# Patient Record
Sex: FEMALE | HISPANIC OR LATINO | URBAN - METROPOLITAN AREA
[De-identification: names, ages, dates, MRNs, and addresses within clinical notes are randomized per-mention and may not be internally consistent; named-entity substitution may affect disease eponyms.]

---

## 2017-06-22 ENCOUNTER — APPOINTMENT (RX ONLY)
Dept: URBAN - METROPOLITAN AREA CLINIC 161 | Facility: CLINIC | Age: 77
Setting detail: DERMATOLOGY
End: 2017-06-22

## 2017-06-22 DIAGNOSIS — L72.0 EPIDERMAL CYST: ICD-10-CM

## 2017-06-22 DIAGNOSIS — L81.4 OTHER MELANIN HYPERPIGMENTATION: ICD-10-CM

## 2017-06-22 DIAGNOSIS — L82.1 OTHER SEBORRHEIC KERATOSIS: ICD-10-CM

## 2017-06-22 DIAGNOSIS — D22 MELANOCYTIC NEVI: ICD-10-CM

## 2017-06-22 DIAGNOSIS — L90.8 OTHER ATROPHIC DISORDERS OF SKIN: ICD-10-CM

## 2017-06-22 DIAGNOSIS — L81.8 OTHER SPECIFIED DISORDERS OF PIGMENTATION: ICD-10-CM

## 2017-06-22 DIAGNOSIS — D18.0 HEMANGIOMA: ICD-10-CM

## 2017-06-22 DIAGNOSIS — L81.0 POSTINFLAMMATORY HYPERPIGMENTATION: ICD-10-CM

## 2017-06-22 PROBLEM — D22.61 MELANOCYTIC NEVI OF RIGHT UPPER LIMB, INCLUDING SHOULDER: Status: ACTIVE | Noted: 2017-06-22

## 2017-06-22 PROBLEM — J45.909 UNSPECIFIED ASTHMA, UNCOMPLICATED: Status: ACTIVE | Noted: 2017-06-22

## 2017-06-22 PROBLEM — J30.1 ALLERGIC RHINITIS DUE TO POLLEN: Status: ACTIVE | Noted: 2017-06-22

## 2017-06-22 PROBLEM — D22.72 MELANOCYTIC NEVI OF LEFT LOWER LIMB, INCLUDING HIP: Status: ACTIVE | Noted: 2017-06-22

## 2017-06-22 PROBLEM — D22.5 MELANOCYTIC NEVI OF TRUNK: Status: ACTIVE | Noted: 2017-06-22

## 2017-06-22 PROBLEM — D18.01 HEMANGIOMA OF SKIN AND SUBCUTANEOUS TISSUE: Status: ACTIVE | Noted: 2017-06-22

## 2017-06-22 PROCEDURE — 99213 OFFICE O/P EST LOW 20 MIN: CPT

## 2017-06-22 PROCEDURE — ? OBSERVATION

## 2017-06-22 PROCEDURE — ? COUNSELING

## 2017-06-22 PROCEDURE — ? REFERRAL CORRESPONDENCE

## 2017-06-22 ASSESSMENT — LOCATION DETAILED DESCRIPTION DERM
LOCATION DETAILED: RIGHT DISTAL POSTERIOR THIGH
LOCATION DETAILED: LEFT MEDIAL SUPERIOR CHEST
LOCATION DETAILED: RIGHT LATERAL ABDOMEN
LOCATION DETAILED: RIGHT CENTRAL MALAR CHEEK
LOCATION DETAILED: RIGHT CENTRAL LATERAL NECK
LOCATION DETAILED: LEFT PROXIMAL PRETIBIAL REGION
LOCATION DETAILED: RIGHT PROXIMAL LATERAL CALF
LOCATION DETAILED: LEFT NASAL DORSUM
LOCATION DETAILED: RIGHT ANTERIOR DISTAL THIGH
LOCATION DETAILED: LEFT FOREHEAD
LOCATION DETAILED: LEFT RADIAL DORSAL HAND
LOCATION DETAILED: LEFT LOWER CUTANEOUS LIP
LOCATION DETAILED: LEFT MID-UPPER BACK
LOCATION DETAILED: LEFT CENTRAL LATERAL NECK
LOCATION DETAILED: COLUMELLA
LOCATION DETAILED: LEFT MEDIAL BREAST 8-9:00 REGION
LOCATION DETAILED: LEFT SUPERIOR MEDIAL MIDBACK
LOCATION DETAILED: RIGHT MEDIAL BREAST 12-1:00 REGION
LOCATION DETAILED: LEFT ANTERIOR SHOULDER
LOCATION DETAILED: LEFT PROXIMAL POSTERIOR UPPER ARM
LOCATION DETAILED: RIGHT LATERAL SUPERIOR EYELID
LOCATION DETAILED: LEFT ANTERIOR DISTAL THIGH
LOCATION DETAILED: RIGHT CENTRAL TEMPLE
LOCATION DETAILED: RIGHT PROXIMAL RADIAL DORSAL FOREARM
LOCATION DETAILED: LEFT DISTAL LATERAL CALF
LOCATION DETAILED: RIGHT PROXIMAL LATERAL POSTERIOR UPPER ARM
LOCATION DETAILED: RIGHT BUTTOCK
LOCATION DETAILED: RIGHT SUPERIOR UPPER BACK
LOCATION DETAILED: LEFT DISTAL POSTERIOR THIGH
LOCATION DETAILED: RIGHT LATERAL SUPERIOR CHEST
LOCATION DETAILED: LEFT PROXIMAL DORSAL FOREARM
LOCATION DETAILED: RIGHT POSTERIOR SHOULDER
LOCATION DETAILED: LEFT VENTRAL LATERAL PROXIMAL FOREARM
LOCATION DETAILED: RIGHT MEDIAL FOREHEAD
LOCATION DETAILED: RIGHT DISTAL PRETIBIAL REGION
LOCATION DETAILED: RIGHT INFERIOR LATERAL UPPER BACK
LOCATION DETAILED: RIGHT CENTRAL ZYGOMA

## 2017-06-22 ASSESSMENT — LOCATION ZONE DERM
LOCATION ZONE: LIP
LOCATION ZONE: EYELID
LOCATION ZONE: LEG
LOCATION ZONE: NECK
LOCATION ZONE: HAND
LOCATION ZONE: NOSE
LOCATION ZONE: TRUNK
LOCATION ZONE: FACE
LOCATION ZONE: ARM

## 2017-06-22 ASSESSMENT — LOCATION SIMPLE DESCRIPTION DERM
LOCATION SIMPLE: NECK
LOCATION SIMPLE: LEFT POSTERIOR THIGH
LOCATION SIMPLE: LEFT LOWER BACK
LOCATION SIMPLE: NOSE
LOCATION SIMPLE: RIGHT THIGH
LOCATION SIMPLE: RIGHT CHEEK
LOCATION SIMPLE: RIGHT CALF
LOCATION SIMPLE: RIGHT FOREHEAD
LOCATION SIMPLE: RIGHT SHOULDER
LOCATION SIMPLE: RIGHT PRETIBIAL REGION
LOCATION SIMPLE: CHEST
LOCATION SIMPLE: RIGHT ZYGOMA
LOCATION SIMPLE: LEFT POSTERIOR UPPER ARM
LOCATION SIMPLE: RIGHT SUPERIOR EYELID
LOCATION SIMPLE: LEFT THIGH
LOCATION SIMPLE: LEFT UPPER BACK
LOCATION SIMPLE: RIGHT POSTERIOR THIGH
LOCATION SIMPLE: LEFT BREAST
LOCATION SIMPLE: RIGHT BREAST
LOCATION SIMPLE: LEFT SHOULDER
LOCATION SIMPLE: ABDOMEN
LOCATION SIMPLE: RIGHT POSTERIOR UPPER ARM
LOCATION SIMPLE: LEFT PRETIBIAL REGION
LOCATION SIMPLE: RIGHT UPPER BACK
LOCATION SIMPLE: RIGHT FOREARM
LOCATION SIMPLE: LEFT LIP
LOCATION SIMPLE: LEFT FOREHEAD
LOCATION SIMPLE: LEFT FOREARM
LOCATION SIMPLE: LEFT CALF
LOCATION SIMPLE: LEFT HAND
LOCATION SIMPLE: RIGHT TEMPLE
LOCATION SIMPLE: RIGHT BUTTOCK
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
## 2017-07-12 ENCOUNTER — FOLLOW UP ESTABLISHED (OUTPATIENT)
Dept: URBAN - METROPOLITAN AREA CLINIC 51 | Facility: CLINIC | Age: 77
End: 2017-07-12
Payer: MEDICARE

## 2017-07-12 PROCEDURE — 92014 COMPRE OPH EXAM EST PT 1/>: CPT | Performed by: OPTOMETRIST

## 2017-07-12 PROCEDURE — 92250 FUNDUS PHOTOGRAPHY W/I&R: CPT | Performed by: OPTOMETRIST

## 2017-07-12 ASSESSMENT — VISUAL ACUITY
OS: 20/20
OD: 20/20

## 2017-07-12 ASSESSMENT — INTRAOCULAR PRESSURE
OS: 18
OD: 18

## 2018-02-08 ENCOUNTER — FOLLOW UP ESTABLISHED (OUTPATIENT)
Dept: URBAN - METROPOLITAN AREA CLINIC 44 | Facility: CLINIC | Age: 78
End: 2018-02-08
Payer: MEDICARE

## 2018-02-08 DIAGNOSIS — B02.29 OTHER POSTHERPETIC NERVOUS SYSTEM INVOLVEMENT: ICD-10-CM

## 2018-02-08 PROCEDURE — 92012 INTRM OPH EXAM EST PATIENT: CPT | Performed by: OPTOMETRIST

## 2018-06-14 ENCOUNTER — APPOINTMENT (RX ONLY)
Dept: URBAN - METROPOLITAN AREA CLINIC 161 | Facility: CLINIC | Age: 78
Setting detail: DERMATOLOGY
End: 2018-06-14

## 2018-06-14 DIAGNOSIS — L81.0 POSTINFLAMMATORY HYPERPIGMENTATION: ICD-10-CM

## 2018-06-14 DIAGNOSIS — L90.8 OTHER ATROPHIC DISORDERS OF SKIN: ICD-10-CM

## 2018-06-14 DIAGNOSIS — L81.4 OTHER MELANIN HYPERPIGMENTATION: ICD-10-CM

## 2018-06-14 DIAGNOSIS — D22 MELANOCYTIC NEVI: ICD-10-CM

## 2018-06-14 DIAGNOSIS — L81.8 OTHER SPECIFIED DISORDERS OF PIGMENTATION: ICD-10-CM

## 2018-06-14 DIAGNOSIS — L82.1 OTHER SEBORRHEIC KERATOSIS: ICD-10-CM

## 2018-06-14 PROBLEM — D22.5 MELANOCYTIC NEVI OF TRUNK: Status: ACTIVE | Noted: 2018-06-14

## 2018-06-14 PROBLEM — Z85.3 PERSONAL HISTORY OF MALIGNANT NEOPLASM OF BREAST: Status: ACTIVE | Noted: 2018-06-14

## 2018-06-14 PROBLEM — D22.71 MELANOCYTIC NEVI OF RIGHT LOWER LIMB, INCLUDING HIP: Status: ACTIVE | Noted: 2018-06-14

## 2018-06-14 PROBLEM — D22.72 MELANOCYTIC NEVI OF LEFT LOWER LIMB, INCLUDING HIP: Status: ACTIVE | Noted: 2018-06-14

## 2018-06-14 PROCEDURE — ? COUNSELING

## 2018-06-14 PROCEDURE — ? REFERRAL CORRESPONDENCE

## 2018-06-14 PROCEDURE — 99213 OFFICE O/P EST LOW 20 MIN: CPT

## 2018-06-14 PROCEDURE — ? OBSERVATION

## 2018-06-14 ASSESSMENT — LOCATION DETAILED DESCRIPTION DERM
LOCATION DETAILED: LEFT NASAL DORSUM
LOCATION DETAILED: LEFT PROXIMAL LATERAL POSTERIOR THIGH
LOCATION DETAILED: LEFT MEDIAL BREAST 8-9:00 REGION
LOCATION DETAILED: LEFT RADIAL DORSAL HAND
LOCATION DETAILED: RIGHT SUPERIOR UPPER BACK
LOCATION DETAILED: LEFT SUPERIOR MEDIAL UPPER BACK
LOCATION DETAILED: LEFT CENTRAL ZYGOMA
LOCATION DETAILED: RIGHT ANTERIOR PROXIMAL THIGH
LOCATION DETAILED: RIGHT ULNAR DORSAL HAND
LOCATION DETAILED: LEFT SUPERIOR FOREHEAD
LOCATION DETAILED: RIGHT DISTAL POSTERIOR UPPER ARM
LOCATION DETAILED: LEFT PROXIMAL DORSAL FOREARM
LOCATION DETAILED: RIGHT ANTERIOR DISTAL THIGH
LOCATION DETAILED: LEFT PROXIMAL LATERAL POSTERIOR UPPER ARM
LOCATION DETAILED: LEFT PROXIMAL PRETIBIAL REGION
LOCATION DETAILED: LEFT MID-UPPER BACK
LOCATION DETAILED: RIGHT DISTAL CALF
LOCATION DETAILED: RIGHT MID-UPPER BACK
LOCATION DETAILED: LEFT DISTAL RADIAL DORSAL FOREARM
LOCATION DETAILED: RIGHT RIB CAGE
LOCATION DETAILED: RIGHT SUPERIOR MEDIAL MIDBACK
LOCATION DETAILED: RIGHT INFERIOR MEDIAL FOREHEAD
LOCATION DETAILED: LEFT SUPERIOR LATERAL MALAR CHEEK
LOCATION DETAILED: LEFT ANTERIOR SHOULDER
LOCATION DETAILED: LEFT SUPERIOR LATERAL MIDBACK
LOCATION DETAILED: RIGHT LATERAL ABDOMEN
LOCATION DETAILED: SUBXIPHOID
LOCATION DETAILED: LEFT PROXIMAL CALF
LOCATION DETAILED: LEFT LATERAL BREAST 2-3:00 REGION
LOCATION DETAILED: RIGHT PROXIMAL PRETIBIAL REGION
LOCATION DETAILED: RIGHT PROXIMAL DORSAL FOREARM
LOCATION DETAILED: RIGHT PERIAREOLAR BREAST 11-12:00 REGION
LOCATION DETAILED: LEFT DISTAL POSTERIOR THIGH
LOCATION DETAILED: LEFT RIB CAGE
LOCATION DETAILED: LEFT MEDIAL SUPERIOR CHEST
LOCATION DETAILED: LEFT MEDIAL BUTTOCK

## 2018-06-14 ASSESSMENT — LOCATION SIMPLE DESCRIPTION DERM
LOCATION SIMPLE: LEFT FOREHEAD
LOCATION SIMPLE: LEFT BUTTOCK
LOCATION SIMPLE: RIGHT POSTERIOR UPPER ARM
LOCATION SIMPLE: LEFT POSTERIOR UPPER ARM
LOCATION SIMPLE: LEFT UPPER BACK
LOCATION SIMPLE: RIGHT FOREHEAD
LOCATION SIMPLE: RIGHT BREAST
LOCATION SIMPLE: LEFT BREAST
LOCATION SIMPLE: LEFT FOREARM
LOCATION SIMPLE: RIGHT THIGH
LOCATION SIMPLE: RIGHT UPPER BACK
LOCATION SIMPLE: LEFT LOWER BACK
LOCATION SIMPLE: RIGHT HAND
LOCATION SIMPLE: RIGHT LOWER BACK
LOCATION SIMPLE: LEFT CALF
LOCATION SIMPLE: LEFT CHEEK
LOCATION SIMPLE: RIGHT PRETIBIAL REGION
LOCATION SIMPLE: LEFT POSTERIOR THIGH
LOCATION SIMPLE: LEFT SHOULDER
LOCATION SIMPLE: CHEST
LOCATION SIMPLE: LEFT PRETIBIAL REGION
LOCATION SIMPLE: LEFT HAND
LOCATION SIMPLE: LEFT ZYGOMA
LOCATION SIMPLE: RIGHT FOREARM
LOCATION SIMPLE: NOSE
LOCATION SIMPLE: ABDOMEN
LOCATION SIMPLE: RIGHT CALF

## 2018-06-14 ASSESSMENT — LOCATION ZONE DERM
LOCATION ZONE: FACE
LOCATION ZONE: ARM
LOCATION ZONE: TRUNK
LOCATION ZONE: HAND
LOCATION ZONE: LEG
LOCATION ZONE: NOSE
LOCATION ZONE: FACE
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
## 2018-07-12 ENCOUNTER — FOLLOW UP ESTABLISHED (OUTPATIENT)
Dept: URBAN - METROPOLITAN AREA CLINIC 51 | Facility: CLINIC | Age: 78
End: 2018-07-12
Payer: MEDICARE

## 2018-07-12 DIAGNOSIS — H04.123 DRY EYE SYNDROME OF BILATERAL LACRIMAL GLANDS: Primary | ICD-10-CM

## 2018-07-12 DIAGNOSIS — Z96.1 PRESENCE OF INTRAOCULAR LENS: ICD-10-CM

## 2018-07-12 DIAGNOSIS — H52.4 PRESBYOPIA: ICD-10-CM

## 2018-07-12 PROCEDURE — 92250 FUNDUS PHOTOGRAPHY W/I&R: CPT | Performed by: OPTOMETRIST

## 2018-07-12 PROCEDURE — 92015 DETERMINE REFRACTIVE STATE: CPT | Performed by: OPTOMETRIST

## 2018-07-12 PROCEDURE — 92014 COMPRE OPH EXAM EST PT 1/>: CPT | Performed by: OPTOMETRIST

## 2018-07-12 ASSESSMENT — VISUAL ACUITY
OS: 20/20
OD: 20/25

## 2018-07-12 ASSESSMENT — KERATOMETRY
OD: 43.83
OS: 43.70

## 2018-07-12 ASSESSMENT — INTRAOCULAR PRESSURE
OD: 18
OS: 18

## 2019-06-13 ENCOUNTER — APPOINTMENT (RX ONLY)
Dept: URBAN - METROPOLITAN AREA CLINIC 173 | Facility: CLINIC | Age: 79
Setting detail: DERMATOLOGY
End: 2019-06-13

## 2019-06-13 DIAGNOSIS — L82.1 OTHER SEBORRHEIC KERATOSIS: ICD-10-CM

## 2019-06-13 DIAGNOSIS — L29.89 OTHER PRURITUS: ICD-10-CM

## 2019-06-13 DIAGNOSIS — L81.8 OTHER SPECIFIED DISORDERS OF PIGMENTATION: ICD-10-CM

## 2019-06-13 DIAGNOSIS — L57.8 OTHER SKIN CHANGES DUE TO CHRONIC EXPOSURE TO NONIONIZING RADIATION: ICD-10-CM

## 2019-06-13 DIAGNOSIS — L29.8 OTHER PRURITUS: ICD-10-CM

## 2019-06-13 DIAGNOSIS — D22 MELANOCYTIC NEVI: ICD-10-CM

## 2019-06-13 PROBLEM — D22.71 MELANOCYTIC NEVI OF RIGHT LOWER LIMB, INCLUDING HIP: Status: ACTIVE | Noted: 2019-06-13

## 2019-06-13 PROBLEM — D22.62 MELANOCYTIC NEVI OF LEFT UPPER LIMB, INCLUDING SHOULDER: Status: ACTIVE | Noted: 2019-06-13

## 2019-06-13 PROBLEM — D22.61 MELANOCYTIC NEVI OF RIGHT UPPER LIMB, INCLUDING SHOULDER: Status: ACTIVE | Noted: 2019-06-13

## 2019-06-13 PROBLEM — D22.5 MELANOCYTIC NEVI OF TRUNK: Status: ACTIVE | Noted: 2019-06-13

## 2019-06-13 PROBLEM — J30.1 ALLERGIC RHINITIS DUE TO POLLEN: Status: ACTIVE | Noted: 2019-06-13

## 2019-06-13 PROCEDURE — ? OBSERVATION

## 2019-06-13 PROCEDURE — 99213 OFFICE O/P EST LOW 20 MIN: CPT

## 2019-06-13 PROCEDURE — ? COUNSELING

## 2019-06-13 PROCEDURE — ? DIAGNOSIS COMMENT

## 2019-06-13 PROCEDURE — ? PRESCRIPTION

## 2019-06-13 RX ORDER — TRIAMCINOLONE ACETONIDE 1 MG/G
1 CREAM TOPICAL 2
Qty: 1 | Refills: 3 | Status: ERX | COMMUNITY
Start: 2019-06-13

## 2019-06-13 RX ADMIN — TRIAMCINOLONE ACETONIDE 1: 1 CREAM TOPICAL at 00:00

## 2019-06-13 ASSESSMENT — LOCATION DETAILED DESCRIPTION DERM
LOCATION DETAILED: GLUTEAL CLEFT
LOCATION DETAILED: LEFT DISTAL POSTERIOR UPPER ARM
LOCATION DETAILED: RIGHT DISTAL CALF
LOCATION DETAILED: PERIUMBILICAL SKIN
LOCATION DETAILED: RIGHT PROXIMAL DORSAL FOREARM
LOCATION DETAILED: LEFT DISTAL CALF
LOCATION DETAILED: LEFT ANTERIOR SHOULDER
LOCATION DETAILED: LEFT VENTRAL LATERAL DISTAL FOREARM
LOCATION DETAILED: LEFT AXILLARY TAIL OF BREAST
LOCATION DETAILED: RIGHT PROXIMAL PRETIBIAL REGION
LOCATION DETAILED: LEFT DISTAL POSTERIOR THIGH
LOCATION DETAILED: LEFT PROXIMAL DORSAL FOREARM
LOCATION DETAILED: LEFT INFERIOR LATERAL MIDBACK
LOCATION DETAILED: RIGHT ANTERIOR DISTAL THIGH
LOCATION DETAILED: RIGHT LATERAL BREAST 11-12:00 REGION
LOCATION DETAILED: RIGHT RADIAL DORSAL HAND
LOCATION DETAILED: LEFT SUPERIOR CENTRAL BUCCAL CHEEK
LOCATION DETAILED: LEFT MEDIAL SUPERIOR CHEST
LOCATION DETAILED: LEFT POSTERIOR SHOULDER
LOCATION DETAILED: LEFT BUTTOCK
LOCATION DETAILED: RIGHT PROXIMAL LATERAL POSTERIOR THIGH
LOCATION DETAILED: LEFT RADIAL DORSAL HAND
LOCATION DETAILED: LEFT SUPERIOR MEDIAL FOREHEAD
LOCATION DETAILED: RIGHT POSTERIOR SHOULDER
LOCATION DETAILED: LEFT VENTRAL LATERAL DISTAL FOREARM

## 2019-06-13 ASSESSMENT — LOCATION SIMPLE DESCRIPTION DERM
LOCATION SIMPLE: LEFT HAND
LOCATION SIMPLE: LEFT FOREARM
LOCATION SIMPLE: LEFT FOREARM
LOCATION SIMPLE: LEFT POSTERIOR UPPER ARM
LOCATION SIMPLE: RIGHT HAND
LOCATION SIMPLE: ABDOMEN
LOCATION SIMPLE: RIGHT SHOULDER
LOCATION SIMPLE: LEFT BREAST
LOCATION SIMPLE: RIGHT BREAST
LOCATION SIMPLE: LEFT LOWER BACK
LOCATION SIMPLE: CHEST
LOCATION SIMPLE: LEFT FOREHEAD
LOCATION SIMPLE: LEFT CALF
LOCATION SIMPLE: LEFT BUTTOCK
LOCATION SIMPLE: LEFT POSTERIOR THIGH
LOCATION SIMPLE: RIGHT PRETIBIAL REGION
LOCATION SIMPLE: RIGHT THIGH
LOCATION SIMPLE: LEFT CHEEK
LOCATION SIMPLE: RIGHT CALF
LOCATION SIMPLE: RIGHT FOREARM
LOCATION SIMPLE: GLUTEAL CLEFT
LOCATION SIMPLE: RIGHT POSTERIOR THIGH
LOCATION SIMPLE: LEFT SHOULDER

## 2019-06-13 ASSESSMENT — LOCATION ZONE DERM
LOCATION ZONE: LEG
LOCATION ZONE: TRUNK
LOCATION ZONE: ARM
LOCATION ZONE: LEG
LOCATION ZONE: HAND
LOCATION ZONE: ARM
LOCATION ZONE: FACE

## 2019-07-15 ENCOUNTER — FOLLOW UP ESTABLISHED (OUTPATIENT)
Dept: URBAN - METROPOLITAN AREA CLINIC 51 | Facility: CLINIC | Age: 79
End: 2019-07-15
Payer: MEDICARE

## 2019-07-15 DIAGNOSIS — H01.024 SQUAMOUS BLEPHARITIS OF LEFT UPPER LID: ICD-10-CM

## 2019-07-15 DIAGNOSIS — Z79.84 LONG TERM (CURRENT) USE OF ORAL HYPOGLYCEMIC DRUGS: ICD-10-CM

## 2019-07-15 DIAGNOSIS — D31.32 BENIGN NEOPLASM OF LEFT CHOROID: ICD-10-CM

## 2019-07-15 DIAGNOSIS — E11.9 TYPE 2 DIABETES MELLITUS WITHOUT COMPLICATIONS: ICD-10-CM

## 2019-07-15 PROCEDURE — 92014 COMPRE OPH EXAM EST PT 1/>: CPT | Performed by: OPTOMETRIST

## 2019-07-15 PROCEDURE — 92250 FUNDUS PHOTOGRAPHY W/I&R: CPT | Performed by: OPTOMETRIST

## 2019-07-15 RX ORDER — NEOMYCIN SULFATE, POLYMYXIN B SULFATE AND DEXAMETHASONE 3.5; 10000; 1 MG/ML; [USP'U]/ML; MG/ML
SUSPENSION OPHTHALMIC
Qty: 1 | Refills: 0 | Status: INACTIVE
Start: 2019-07-15 | End: 2020-08-03

## 2019-07-15 ASSESSMENT — VISUAL ACUITY
OD: 20/20
OS: 20/20

## 2019-07-15 ASSESSMENT — INTRAOCULAR PRESSURE
OD: 16
OS: 16

## 2019-08-05 ENCOUNTER — FOLLOW UP ESTABLISHED (OUTPATIENT)
Dept: URBAN - METROPOLITAN AREA CLINIC 51 | Facility: CLINIC | Age: 79
End: 2019-08-05
Payer: MEDICARE

## 2019-08-05 DIAGNOSIS — H01.022 SQUAMOUS BLEPHARITIS OF RIGHT LOWER LID: ICD-10-CM

## 2019-08-05 DIAGNOSIS — H01.025 SQUAMOUS BLEPHARITIS OF LEFT LOWER LID: ICD-10-CM

## 2019-08-05 PROCEDURE — 92012 INTRM OPH EXAM EST PATIENT: CPT | Performed by: OPTOMETRIST

## 2019-08-05 ASSESSMENT — INTRAOCULAR PRESSURE
OS: 19
OD: 19

## 2019-09-05 ENCOUNTER — FOLLOW UP ESTABLISHED (OUTPATIENT)
Dept: URBAN - METROPOLITAN AREA CLINIC 51 | Facility: CLINIC | Age: 79
End: 2019-09-05
Payer: MEDICARE

## 2019-09-05 PROCEDURE — 92012 INTRM OPH EXAM EST PATIENT: CPT | Performed by: OPTOMETRIST

## 2019-11-08 ENCOUNTER — FOLLOW UP ESTABLISHED (OUTPATIENT)
Dept: URBAN - METROPOLITAN AREA CLINIC 51 | Facility: CLINIC | Age: 79
End: 2019-11-08
Payer: MEDICARE

## 2019-11-08 DIAGNOSIS — H16.213 EXPOSURE KERATOCONJUNCTIVITIS, BILATERAL: ICD-10-CM

## 2019-11-08 DIAGNOSIS — H01.021 SQUAMOUS BLEPHARITIS OF RIGHT UPPER LID: Primary | ICD-10-CM

## 2019-11-08 PROCEDURE — 0330T TEAR FILM IMG UNI/BI W/I&R: CPT | Performed by: OPTOMETRIST

## 2019-11-08 PROCEDURE — 83861 MICROFLUID ANALY TEARS: CPT | Performed by: OPTOMETRIST

## 2019-11-08 PROCEDURE — BRUDE BRUDER EYE MOIST COMPRESS: CUSTOM | Performed by: OPTOMETRIST

## 2019-11-08 PROCEDURE — 83516 IMMUNOASSAY NONANTIBODY: CPT | Performed by: OPTOMETRIST

## 2019-12-09 ENCOUNTER — FOLLOW UP ESTABLISHED (OUTPATIENT)
Dept: URBAN - METROPOLITAN AREA CLINIC 51 | Facility: CLINIC | Age: 79
End: 2019-12-09
Payer: MEDICARE

## 2020-06-04 ENCOUNTER — APPOINTMENT (RX ONLY)
Dept: URBAN - METROPOLITAN AREA CLINIC 158 | Facility: CLINIC | Age: 80
Setting detail: DERMATOLOGY
End: 2020-06-04

## 2020-06-04 DIAGNOSIS — D22 MELANOCYTIC NEVI: ICD-10-CM

## 2020-06-04 DIAGNOSIS — L82.1 OTHER SEBORRHEIC KERATOSIS: ICD-10-CM

## 2020-06-04 DIAGNOSIS — Z71.89 OTHER SPECIFIED COUNSELING: ICD-10-CM

## 2020-06-04 DIAGNOSIS — L91.8 OTHER HYPERTROPHIC DISORDERS OF THE SKIN: ICD-10-CM

## 2020-06-04 DIAGNOSIS — L81.4 OTHER MELANIN HYPERPIGMENTATION: ICD-10-CM

## 2020-06-04 PROBLEM — D22.71 MELANOCYTIC NEVI OF RIGHT LOWER LIMB, INCLUDING HIP: Status: ACTIVE | Noted: 2020-06-04

## 2020-06-04 PROBLEM — D22.72 MELANOCYTIC NEVI OF LEFT LOWER LIMB, INCLUDING HIP: Status: ACTIVE | Noted: 2020-06-04

## 2020-06-04 PROBLEM — D22.5 MELANOCYTIC NEVI OF TRUNK: Status: ACTIVE | Noted: 2020-06-04

## 2020-06-04 PROBLEM — D22.39 MELANOCYTIC NEVI OF OTHER PARTS OF FACE: Status: ACTIVE | Noted: 2020-06-04

## 2020-06-04 PROCEDURE — ? COUNSELING

## 2020-06-04 PROCEDURE — 99213 OFFICE O/P EST LOW 20 MIN: CPT

## 2020-06-04 ASSESSMENT — LOCATION ZONE DERM
LOCATION ZONE: LEG
LOCATION ZONE: ARM
LOCATION ZONE: NECK
LOCATION ZONE: FACE
LOCATION ZONE: TRUNK

## 2020-06-04 ASSESSMENT — LOCATION DETAILED DESCRIPTION DERM
LOCATION DETAILED: PERIUMBILICAL SKIN
LOCATION DETAILED: RIGHT ANTERIOR DISTAL THIGH
LOCATION DETAILED: RIGHT MID-UPPER BACK
LOCATION DETAILED: RIGHT LATERAL BREAST 6-7:00 REGION
LOCATION DETAILED: RIGHT SUPERIOR MEDIAL FOREHEAD
LOCATION DETAILED: RIGHT LATERAL KNEE
LOCATION DETAILED: LEFT INFERIOR LATERAL NECK
LOCATION DETAILED: LEFT LATERAL MALAR CHEEK
LOCATION DETAILED: RIGHT INFERIOR MEDIAL MIDBACK
LOCATION DETAILED: LEFT MID-UPPER BACK
LOCATION DETAILED: LEFT VENTRAL LATERAL DISTAL FOREARM
LOCATION DETAILED: LEFT SUPERIOR FOREHEAD
LOCATION DETAILED: LEFT DISTAL POSTERIOR THIGH
LOCATION DETAILED: LEFT SUPERIOR MEDIAL LOWER BACK
LOCATION DETAILED: LEFT CENTRAL EYEBROW
LOCATION DETAILED: LEFT DISTAL CALF
LOCATION DETAILED: RIGHT MEDIAL ZYGOMA
LOCATION DETAILED: LEFT CLAVICULAR NECK
LOCATION DETAILED: LEFT INFRAMAMMARY CREASE (INNER QUADRANT)
LOCATION DETAILED: RIGHT DISTAL ULNAR DORSAL FOREARM
LOCATION DETAILED: LEFT ANTERIOR PROXIMAL THIGH
LOCATION DETAILED: LEFT LATERAL KNEE
LOCATION DETAILED: RIGHT DISTAL CALF
LOCATION DETAILED: RIGHT DISTAL POSTERIOR THIGH

## 2020-06-04 ASSESSMENT — LOCATION SIMPLE DESCRIPTION DERM
LOCATION SIMPLE: LEFT BREAST
LOCATION SIMPLE: RIGHT KNEE
LOCATION SIMPLE: LEFT CHEEK
LOCATION SIMPLE: LEFT CALF
LOCATION SIMPLE: LEFT FOREARM
LOCATION SIMPLE: RIGHT CALF
LOCATION SIMPLE: LEFT THIGH
LOCATION SIMPLE: LEFT UPPER BACK
LOCATION SIMPLE: LEFT POSTERIOR THIGH
LOCATION SIMPLE: LEFT KNEE
LOCATION SIMPLE: LEFT FOREHEAD
LOCATION SIMPLE: LEFT LOWER BACK
LOCATION SIMPLE: RIGHT POSTERIOR THIGH
LOCATION SIMPLE: RIGHT UPPER BACK
LOCATION SIMPLE: RIGHT FOREARM
LOCATION SIMPLE: RIGHT BREAST
LOCATION SIMPLE: RIGHT ZYGOMA
LOCATION SIMPLE: ABDOMEN
LOCATION SIMPLE: LEFT EYEBROW
LOCATION SIMPLE: RIGHT FOREHEAD
LOCATION SIMPLE: LEFT ANTERIOR NECK
LOCATION SIMPLE: RIGHT THIGH
LOCATION SIMPLE: RIGHT LOWER BACK

## 2020-06-04 NOTE — PROCEDURE: MIPS QUALITY
Quality 110: Preventive Care And Screening: Influenza Immunization: Influenza Immunization Administered during Influenza season
Detail Level: Detailed
Quality 111:Pneumonia Vaccination Status For Older Adults: Pneumococcal Vaccination Previously Received
ABSCESS

## 2020-08-03 ENCOUNTER — FOLLOW UP ESTABLISHED (OUTPATIENT)
Dept: URBAN - METROPOLITAN AREA CLINIC 51 | Facility: CLINIC | Age: 80
End: 2020-08-03
Payer: MEDICARE

## 2020-08-03 PROCEDURE — 92250 FUNDUS PHOTOGRAPHY W/I&R: CPT | Performed by: OPTOMETRIST

## 2020-08-03 PROCEDURE — 92014 COMPRE OPH EXAM EST PT 1/>: CPT | Performed by: OPTOMETRIST

## 2020-08-03 PROCEDURE — 92015 DETERMINE REFRACTIVE STATE: CPT | Performed by: OPTOMETRIST

## 2020-08-03 ASSESSMENT — KERATOMETRY
OS: 43.64
OD: 42.98

## 2020-08-03 ASSESSMENT — VISUAL ACUITY
OS: 20/25
OD: 20/25

## 2020-08-03 ASSESSMENT — INTRAOCULAR PRESSURE
OD: 9
OS: 10

## 2021-07-15 ENCOUNTER — APPOINTMENT (RX ONLY)
Dept: URBAN - METROPOLITAN AREA CLINIC 158 | Facility: CLINIC | Age: 81
Setting detail: DERMATOLOGY
End: 2021-07-15

## 2021-08-02 ENCOUNTER — APPOINTMENT (RX ONLY)
Dept: URBAN - METROPOLITAN AREA CLINIC 158 | Facility: CLINIC | Age: 81
Setting detail: DERMATOLOGY
End: 2021-08-02

## 2021-08-02 DIAGNOSIS — L82.1 OTHER SEBORRHEIC KERATOSIS: ICD-10-CM

## 2021-08-02 DIAGNOSIS — D22 MELANOCYTIC NEVI: ICD-10-CM

## 2021-08-02 DIAGNOSIS — L81.4 OTHER MELANIN HYPERPIGMENTATION: ICD-10-CM

## 2021-08-02 DIAGNOSIS — L72.0 EPIDERMAL CYST: ICD-10-CM

## 2021-08-02 PROBLEM — D22.72 MELANOCYTIC NEVI OF LEFT LOWER LIMB, INCLUDING HIP: Status: ACTIVE | Noted: 2021-08-02

## 2021-08-02 PROBLEM — D22.71 MELANOCYTIC NEVI OF RIGHT LOWER LIMB, INCLUDING HIP: Status: ACTIVE | Noted: 2021-08-02

## 2021-08-02 PROBLEM — D22.39 MELANOCYTIC NEVI OF OTHER PARTS OF FACE: Status: ACTIVE | Noted: 2021-08-02

## 2021-08-02 PROBLEM — D22.5 MELANOCYTIC NEVI OF TRUNK: Status: ACTIVE | Noted: 2021-08-02

## 2021-08-02 PROBLEM — D22.61 MELANOCYTIC NEVI OF RIGHT UPPER LIMB, INCLUDING SHOULDER: Status: ACTIVE | Noted: 2021-08-02

## 2021-08-02 PROBLEM — D48.5 NEOPLASM OF UNCERTAIN BEHAVIOR OF SKIN: Status: ACTIVE | Noted: 2021-08-02

## 2021-08-02 PROCEDURE — ? BIOPSY BY SHAVE METHOD

## 2021-08-02 PROCEDURE — ? COUNSELING

## 2021-08-02 PROCEDURE — 11102 TANGNTL BX SKIN SINGLE LES: CPT

## 2021-08-02 PROCEDURE — 99213 OFFICE O/P EST LOW 20 MIN: CPT | Mod: 25

## 2021-08-02 ASSESSMENT — LOCATION SIMPLE DESCRIPTION DERM
LOCATION SIMPLE: LEFT UPPER BACK
LOCATION SIMPLE: RIGHT UPPER BACK
LOCATION SIMPLE: INFERIOR FOREHEAD
LOCATION SIMPLE: LEFT THIGH
LOCATION SIMPLE: LEFT EYEBROW
LOCATION SIMPLE: RIGHT LOWER BACK
LOCATION SIMPLE: LEFT BREAST
LOCATION SIMPLE: LEFT LOWER BACK
LOCATION SIMPLE: LEFT KNEE
LOCATION SIMPLE: RIGHT KNEE
LOCATION SIMPLE: LEFT FOREARM
LOCATION SIMPLE: RIGHT CALF
LOCATION SIMPLE: RIGHT POSTERIOR THIGH
LOCATION SIMPLE: ABDOMEN
LOCATION SIMPLE: LEFT ANTERIOR NECK
LOCATION SIMPLE: LEFT FOREHEAD
LOCATION SIMPLE: RIGHT FOREHEAD
LOCATION SIMPLE: LEFT CALF
LOCATION SIMPLE: RIGHT BREAST
LOCATION SIMPLE: LEFT CHEEK
LOCATION SIMPLE: RIGHT FOREARM
LOCATION SIMPLE: RIGHT SHOULDER
LOCATION SIMPLE: LEFT POSTERIOR THIGH
LOCATION SIMPLE: RIGHT THIGH
LOCATION SIMPLE: RIGHT ZYGOMA

## 2021-08-02 ASSESSMENT — LOCATION DETAILED DESCRIPTION DERM
LOCATION DETAILED: LEFT INFRAMAMMARY CREASE (INNER QUADRANT)
LOCATION DETAILED: LEFT ANTERIOR PROXIMAL THIGH
LOCATION DETAILED: LEFT SUPERIOR FOREHEAD
LOCATION DETAILED: RIGHT INFERIOR MEDIAL MIDBACK
LOCATION DETAILED: RIGHT DISTAL ULNAR DORSAL FOREARM
LOCATION DETAILED: RIGHT LATERAL KNEE
LOCATION DETAILED: LEFT DISTAL POSTERIOR THIGH
LOCATION DETAILED: RIGHT POSTERIOR SHOULDER
LOCATION DETAILED: RIGHT MID-UPPER BACK
LOCATION DETAILED: LEFT DISTAL CALF
LOCATION DETAILED: LEFT SUPERIOR MEDIAL LOWER BACK
LOCATION DETAILED: RIGHT ANTERIOR DISTAL THIGH
LOCATION DETAILED: LEFT CENTRAL EYEBROW
LOCATION DETAILED: RIGHT DISTAL CALF
LOCATION DETAILED: LEFT MID-UPPER BACK
LOCATION DETAILED: LEFT LATERAL MALAR CHEEK
LOCATION DETAILED: LEFT CLAVICULAR NECK
LOCATION DETAILED: LEFT VENTRAL LATERAL DISTAL FOREARM
LOCATION DETAILED: RIGHT MEDIAL ZYGOMA
LOCATION DETAILED: RIGHT SUPERIOR MEDIAL FOREHEAD
LOCATION DETAILED: RIGHT LATERAL BREAST 6-7:00 REGION
LOCATION DETAILED: INFERIOR MID FOREHEAD
LOCATION DETAILED: PERIUMBILICAL SKIN
LOCATION DETAILED: LEFT LATERAL KNEE
LOCATION DETAILED: RIGHT DISTAL POSTERIOR THIGH

## 2021-08-02 ASSESSMENT — LOCATION ZONE DERM
LOCATION ZONE: LEG
LOCATION ZONE: TRUNK
LOCATION ZONE: ARM
LOCATION ZONE: NECK
LOCATION ZONE: FACE

## 2021-08-02 NOTE — PROCEDURE: BIOPSY BY SHAVE METHOD
Detail Level: Detailed
Depth Of Biopsy: dermis
Was A Bandage Applied: Yes
Size Of Lesion In Cm: 0.6
X Size Of Lesion In Cm: 0
Biopsy Type: H and E
Biopsy Method: Dermablade
Anesthesia Type: 1% lidocaine with epinephrine
Anesthesia Volume In Cc (Will Not Render If 0): 0.5
Hemostasis: Drysol
Wound Care: Petrolatum
Dressing: bandage
Destruction After The Procedure: No
Type Of Destruction Used: Curettage
Curettage Text: The wound bed was treated with curettage after the biopsy was performed.
Cryotherapy Text: The wound bed was treated with cryotherapy after the biopsy was performed.
Electrodesiccation Text: The wound bed was treated with electrodesiccation after the biopsy was performed.
Electrodesiccation And Curettage Text: The wound bed was treated with electrodesiccation and curettage after the biopsy was performed.
Silver Nitrate Text: The wound bed was treated with silver nitrate after the biopsy was performed.
Lab: [id number]
Lab Facility: [id number]
Triangulation (Location Of Lesion In Relation To Distance From Anatomical Landmarks): 0.6cm
Consent: Written consent was obtained and risks were reviewed including but not limited to scarring, infection, bleeding, scabbing, incomplete removal, nerve damage and allergy to anesthesia.
Post-Care Instructions: I reviewed with the patient in detail post-care instructions. Patient is to keep the biopsy site dry overnight, and then apply bacitracin twice daily until healed. Patient may apply hydrogen peroxide soaks to remove any crusting.
Notification Instructions: Patient will be notified of biopsy results. However, patient instructed to call the office if not contacted within 2 weeks.
Billing Type: Third-Party Bill
Information: Selecting Yes will display possible errors in your note based on the variables you have selected. This validation is only offered as a suggestion for you. PLEASE NOTE THAT THE VALIDATION TEXT WILL BE REMOVED WHEN YOU FINALIZE YOUR NOTE. IF YOU WANT TO FAX A PRELIMINARY NOTE YOU WILL NEED TO TOGGLE THIS TO 'NO' IF YOU DO NOT WANT IT IN YOUR FAXED NOTE.
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
## 2021-08-09 ENCOUNTER — OFFICE VISIT (OUTPATIENT)
Dept: URBAN - METROPOLITAN AREA CLINIC 51 | Facility: CLINIC | Age: 81
End: 2021-08-09
Payer: MEDICARE

## 2021-08-09 DIAGNOSIS — R73.09 OTHER ABNORMAL GLUCOSE: ICD-10-CM

## 2021-08-09 PROCEDURE — 92014 COMPRE OPH EXAM EST PT 1/>: CPT | Performed by: OPTOMETRIST

## 2021-08-09 PROCEDURE — 92250 FUNDUS PHOTOGRAPHY W/I&R: CPT | Performed by: OPTOMETRIST

## 2021-08-09 ASSESSMENT — KERATOMETRY
OS: 43.57
OD: 43.44

## 2021-08-09 ASSESSMENT — INTRAOCULAR PRESSURE
OD: 18
OS: 18

## 2021-08-09 ASSESSMENT — VISUAL ACUITY
OD: 20/25
OS: 20/25

## 2021-08-09 NOTE — IMPRESSION/PLAN
Impression: Tear film insufficiency of bilateral lacrimal glands Plan: Recommend use:
Lipid based Artificial tears QID OU Warm compresses BID Eyelid scrubs 2-3 times per week
> 600 mg Omega 3 per day Hydrate / Add humidifier / Avoid direct air flow / Blink fully

## 2021-08-09 NOTE — IMPRESSION/PLAN
Impression: Other abnormal glucose: R73.09. Plan: No Non-Proliferative Diabetic Retinopathy, no Diabetic Macular Edema and no Neovascularization of the iris, disc, or elsewhere. Discussed ocular and systemic benefits of blood sugar control. Send notes to PCP. Check annually.

## 2021-11-24 ENCOUNTER — APPOINTMENT (RX ONLY)
Dept: URBAN - METROPOLITAN AREA CLINIC 158 | Facility: CLINIC | Age: 81
Setting detail: DERMATOLOGY
End: 2021-11-24

## 2021-11-24 DIAGNOSIS — L82.0 INFLAMED SEBORRHEIC KERATOSIS: ICD-10-CM

## 2021-11-24 DIAGNOSIS — L82.1 OTHER SEBORRHEIC KERATOSIS: ICD-10-CM

## 2021-11-24 PROCEDURE — ? COUNSELING

## 2021-11-24 PROCEDURE — 17110 DESTRUCTION B9 LES UP TO 14: CPT

## 2021-11-24 PROCEDURE — ? LIQUID NITROGEN

## 2021-11-24 ASSESSMENT — LOCATION SIMPLE DESCRIPTION DERM: LOCATION SIMPLE: LEFT CHEEK

## 2021-11-24 ASSESSMENT — LOCATION DETAILED DESCRIPTION DERM
LOCATION DETAILED: LEFT INFERIOR MEDIAL MALAR CHEEK
LOCATION DETAILED: LEFT INFERIOR CENTRAL MALAR CHEEK

## 2021-11-24 ASSESSMENT — LOCATION ZONE DERM: LOCATION ZONE: FACE

## 2021-11-24 NOTE — HPI: ITCHING
What Type Of Note Output Would You Prefer (Optional)?: Bullet Format
How Did Your Itching Occur?: sudden in onset (over a period of weeks to a few months)
How Severe Is Your Itching?: moderate
Additional History: Declined FBSE

## 2021-11-24 NOTE — PROCEDURE: LIQUID NITROGEN
Medical Necessity Information: It is in your best interest to select a reason for this procedure from the list below. All of these items fulfill various CMS LCD requirements except the new and changing color options.
Consent: The patient's consent was obtained including but not limited to risks of crusting, scabbing, blistering, scarring, darker or lighter pigmentary change, recurrence, incomplete removal and infection.
Show Topical Anesthesia Variable?: Yes
Include Z78.9 (Other Specified Conditions Influencing Health Status) As An Associated Diagnosis?: No
Post-Care Instructions: I reviewed with the patient in detail post-care instructions. Patient is to wear sunprotection, and avoid picking at any of the treated lesions. Pt may apply Vaseline to crusted or scabbing areas.
Medical Necessity Clause: This procedure was medically necessary because the lesions that were treated were:
Detail Level: Detailed

## 2022-08-03 ENCOUNTER — APPOINTMENT (RX ONLY)
Dept: URBAN - METROPOLITAN AREA CLINIC 158 | Facility: CLINIC | Age: 82
Setting detail: DERMATOLOGY
End: 2022-08-03

## 2022-08-03 DIAGNOSIS — B35.1 TINEA UNGUIUM: ICD-10-CM

## 2022-08-03 DIAGNOSIS — D22 MELANOCYTIC NEVI: ICD-10-CM

## 2022-08-03 DIAGNOSIS — L81.4 OTHER MELANIN HYPERPIGMENTATION: ICD-10-CM

## 2022-08-03 DIAGNOSIS — L82.1 OTHER SEBORRHEIC KERATOSIS: ICD-10-CM

## 2022-08-03 PROBLEM — D22.5 MELANOCYTIC NEVI OF TRUNK: Status: ACTIVE | Noted: 2022-08-03

## 2022-08-03 PROBLEM — D22.62 MELANOCYTIC NEVI OF LEFT UPPER LIMB, INCLUDING SHOULDER: Status: ACTIVE | Noted: 2022-08-03

## 2022-08-03 PROBLEM — D22.72 MELANOCYTIC NEVI OF LEFT LOWER LIMB, INCLUDING HIP: Status: ACTIVE | Noted: 2022-08-03

## 2022-08-03 PROBLEM — D22.71 MELANOCYTIC NEVI OF RIGHT LOWER LIMB, INCLUDING HIP: Status: ACTIVE | Noted: 2022-08-03

## 2022-08-03 PROCEDURE — 99213 OFFICE O/P EST LOW 20 MIN: CPT

## 2022-08-03 PROCEDURE — ? PATIENT SPECIFIC COUNSELING

## 2022-08-03 PROCEDURE — ? COUNSELING

## 2022-08-03 ASSESSMENT — LOCATION ZONE DERM
LOCATION ZONE: FACE
LOCATION ZONE: TRUNK
LOCATION ZONE: TOENAIL
LOCATION ZONE: LEG
LOCATION ZONE: ARM

## 2022-08-03 ASSESSMENT — LOCATION SIMPLE DESCRIPTION DERM
LOCATION SIMPLE: LEFT CHEEK
LOCATION SIMPLE: CHEST
LOCATION SIMPLE: LEFT FOREHEAD
LOCATION SIMPLE: LEFT THIGH
LOCATION SIMPLE: LEFT LOWER BACK
LOCATION SIMPLE: RIGHT GREAT TOE
LOCATION SIMPLE: LEFT UPPER ARM
LOCATION SIMPLE: UPPER BACK
LOCATION SIMPLE: RIGHT CALF
LOCATION SIMPLE: ABDOMEN

## 2022-08-03 ASSESSMENT — LOCATION DETAILED DESCRIPTION DERM
LOCATION DETAILED: LEFT INFERIOR CENTRAL MALAR CHEEK
LOCATION DETAILED: LEFT MEDIAL FOREHEAD
LOCATION DETAILED: RIGHT DISTAL CALF
LOCATION DETAILED: UPPER STERNUM
LOCATION DETAILED: RIGHT GREAT TOENAIL
LOCATION DETAILED: LEFT ANTERIOR DISTAL THIGH
LOCATION DETAILED: LEFT ANTERIOR PROXIMAL UPPER ARM
LOCATION DETAILED: PERIUMBILICAL SKIN
LOCATION DETAILED: LEFT SUPERIOR LATERAL MIDBACK
LOCATION DETAILED: INFERIOR THORACIC SPINE

## 2022-08-10 ENCOUNTER — OFFICE VISIT (OUTPATIENT)
Dept: URBAN - METROPOLITAN AREA CLINIC 51 | Facility: CLINIC | Age: 82
End: 2022-08-10
Payer: MEDICARE

## 2022-08-10 DIAGNOSIS — D31.31 BENIGN NEOPLASM OF RIGHT CHOROID: ICD-10-CM

## 2022-08-10 DIAGNOSIS — H52.4 PRESBYOPIA: ICD-10-CM

## 2022-08-10 DIAGNOSIS — H43.313 VITREOUS MEMBRANES AND STRANDS, BILATERAL: ICD-10-CM

## 2022-08-10 DIAGNOSIS — R73.09 OTHER ABNORMAL GLUCOSE: Primary | ICD-10-CM

## 2022-08-10 DIAGNOSIS — D31.32 BENIGN NEOPLASM OF LEFT CHOROID: ICD-10-CM

## 2022-08-10 DIAGNOSIS — Z96.1 PRESENCE OF INTRAOCULAR LENS: ICD-10-CM

## 2022-08-10 DIAGNOSIS — H04.123 TEAR FILM INSUFFICIENCY OF BILATERAL LACRIMAL GLANDS: ICD-10-CM

## 2022-08-10 PROCEDURE — 92134 CPTRZ OPH DX IMG PST SGM RTA: CPT | Performed by: OPTOMETRIST

## 2022-08-10 PROCEDURE — 92014 COMPRE OPH EXAM EST PT 1/>: CPT | Performed by: OPTOMETRIST

## 2022-08-10 PROCEDURE — 92250 FUNDUS PHOTOGRAPHY W/I&R: CPT | Performed by: OPTOMETRIST

## 2022-08-10 ASSESSMENT — INTRAOCULAR PRESSURE
OD: 16
OS: 16

## 2022-08-10 ASSESSMENT — VISUAL ACUITY
OD: 20/20
OS: 20/25

## 2022-08-10 NOTE — IMPRESSION/PLAN
Impression: Benign neoplasm of left choroid: D31.32. Plan: Photo documented. Stable, no changes noted.  Monitor yearly with Fundus photos

## 2022-08-10 NOTE — IMPRESSION/PLAN
Impression: Benign neoplasm of right choroid: D31.31. Plan: Photo documented. Stable, no changes noted.  Monitor yearly with Fundus photos

## 2022-08-10 NOTE — IMPRESSION/PLAN
Impression: Other abnormal glucose: R73.09.
-Pt stopped taking Metformin. Plan: No Non-Proliferative Diabetic Retinopathy, no Diabetic Macular Edema and no Neovascularization of the iris, disc, or elsewhere. Discussed ocular and systemic benefits of blood sugar control. Send notes to PCP. Check annually.

## 2022-08-10 NOTE — IMPRESSION/PLAN
Impression: Tear film insufficiency of bilateral lacrimal glands Plan: Pt to continue with PF Refresh ATs QID OU.

## 2023-08-07 ENCOUNTER — APPOINTMENT (RX ONLY)
Dept: URBAN - METROPOLITAN AREA CLINIC 158 | Facility: CLINIC | Age: 83
Setting detail: DERMATOLOGY
End: 2023-08-07

## 2023-08-07 DIAGNOSIS — L71.0 PERIORAL DERMATITIS: ICD-10-CM

## 2023-08-07 DIAGNOSIS — L81.4 OTHER MELANIN HYPERPIGMENTATION: ICD-10-CM

## 2023-08-07 DIAGNOSIS — D22 MELANOCYTIC NEVI: ICD-10-CM

## 2023-08-07 DIAGNOSIS — L82.1 OTHER SEBORRHEIC KERATOSIS: ICD-10-CM

## 2023-08-07 PROBLEM — D22.71 MELANOCYTIC NEVI OF RIGHT LOWER LIMB, INCLUDING HIP: Status: ACTIVE | Noted: 2023-08-07

## 2023-08-07 PROBLEM — D22.72 MELANOCYTIC NEVI OF LEFT LOWER LIMB, INCLUDING HIP: Status: ACTIVE | Noted: 2023-08-07

## 2023-08-07 PROBLEM — D22.5 MELANOCYTIC NEVI OF TRUNK: Status: ACTIVE | Noted: 2023-08-07

## 2023-08-07 PROBLEM — D22.62 MELANOCYTIC NEVI OF LEFT UPPER LIMB, INCLUDING SHOULDER: Status: ACTIVE | Noted: 2023-08-07

## 2023-08-07 PROCEDURE — 99213 OFFICE O/P EST LOW 20 MIN: CPT

## 2023-08-07 PROCEDURE — ? TREATMENT REGIMEN

## 2023-08-07 PROCEDURE — ? COUNSELING

## 2023-08-07 ASSESSMENT — LOCATION SIMPLE DESCRIPTION DERM
LOCATION SIMPLE: LOWER BACK
LOCATION SIMPLE: LEFT THIGH
LOCATION SIMPLE: NOSE
LOCATION SIMPLE: RIGHT FOREARM
LOCATION SIMPLE: LEFT FOREHEAD
LOCATION SIMPLE: ABDOMEN
LOCATION SIMPLE: RIGHT CALF
LOCATION SIMPLE: LEFT LOWER BACK
LOCATION SIMPLE: CHEST
LOCATION SIMPLE: UPPER BACK
LOCATION SIMPLE: LEFT CHEEK
LOCATION SIMPLE: LEFT UPPER ARM
LOCATION SIMPLE: UPPER LIP

## 2023-08-07 ASSESSMENT — LOCATION DETAILED DESCRIPTION DERM
LOCATION DETAILED: PHILTRUM
LOCATION DETAILED: UPPER STERNUM
LOCATION DETAILED: LEFT INFERIOR CENTRAL MALAR CHEEK
LOCATION DETAILED: INFERIOR THORACIC SPINE
LOCATION DETAILED: LEFT MEDIAL FOREHEAD
LOCATION DETAILED: LEFT SUPERIOR LATERAL MIDBACK
LOCATION DETAILED: SUPERIOR LUMBAR SPINE
LOCATION DETAILED: RIGHT DISTAL CALF
LOCATION DETAILED: COLUMELLA
LOCATION DETAILED: PERIUMBILICAL SKIN
LOCATION DETAILED: LEFT ANTERIOR DISTAL THIGH
LOCATION DETAILED: RIGHT DISTAL DORSAL FOREARM
LOCATION DETAILED: LEFT ANTERIOR PROXIMAL UPPER ARM

## 2023-08-07 ASSESSMENT — LOCATION ZONE DERM
LOCATION ZONE: ARM
LOCATION ZONE: FACE
LOCATION ZONE: LEG
LOCATION ZONE: LIP
LOCATION ZONE: NOSE
LOCATION ZONE: TRUNK

## 2023-08-07 NOTE — HPI: FULL BODY SKIN EXAMINATION
How Severe Are Your Spot(S)?: mild
What Is The Reason For Today's Visit?: Full Body Skin Examination
What Is The Reason For Today's Visit? (Being Monitored For X): the development of a new lesion
Additional History: Patient has “rash” coming and going for less than a year. PCP gave her Clotrimazole/ betamethasone

## 2023-08-07 NOTE — PROCEDURE: MIPS QUALITY
Health Maintenance Due   Topic Date Due    Influenza Vaccine (1) 09/01/2021    COVID-19 Vaccine (3 - Booster for Moderna series) 10/21/2021    Cervical Cancer Screening  01/14/2022     Updates were requested from care everywhere.  Chart was reviewed for overdue Proactive [facility] Encounters ([facility]) topics (CRS, Breast Cancer Screening, Eye exam)  Health Maintenance has been updated.  [place] immunization registry triggered.  Immunizations were reconciled.      
Quality 110: Preventive Care And Screening: Influenza Immunization: Influenza Immunization Administered during Influenza season
Detail Level: Detailed
Quality 111:Pneumonia Vaccination Status For Older Adults: Pneumococcal vaccine administered on or after patient’s 60th birthday and before the end of the measurement period
Quality 130: Documentation Of Current Medications In The Medical Record: Current Medications Documented

## 2024-01-08 ENCOUNTER — OFFICE VISIT (OUTPATIENT)
Dept: URBAN - METROPOLITAN AREA CLINIC 51 | Facility: CLINIC | Age: 84
End: 2024-01-08
Payer: MEDICARE

## 2024-01-08 DIAGNOSIS — R73.09 OTHER ABNORMAL GLUCOSE: Primary | ICD-10-CM

## 2024-01-08 DIAGNOSIS — H02.88A MEIBOMIAN GLAND DYSFNCT RIGHT EYE, UPPER AND LOWER EYELIDS: ICD-10-CM

## 2024-01-08 DIAGNOSIS — D31.31 BENIGN NEOPLASM OF RIGHT CHOROID: ICD-10-CM

## 2024-01-08 DIAGNOSIS — H02.88B MEIBOMIAN GLAND DYSFNCT LEFT EYE, UPPER AND LOWER EYELIDS: ICD-10-CM

## 2024-01-08 DIAGNOSIS — H43.313 VITREOUS MEMBRANES AND STRANDS, BILATERAL: ICD-10-CM

## 2024-01-08 DIAGNOSIS — H04.123 TEAR FILM INSUFFICIENCY OF BILATERAL LACRIMAL GLANDS: ICD-10-CM

## 2024-01-08 DIAGNOSIS — D31.32 BENIGN NEOPLASM OF LEFT CHOROID: ICD-10-CM

## 2024-01-08 PROCEDURE — 92133 CPTRZD OPH DX IMG PST SGM ON: CPT | Performed by: OPTOMETRIST

## 2024-01-08 PROCEDURE — 92134 CPTRZ OPH DX IMG PST SGM RTA: CPT | Performed by: OPTOMETRIST

## 2024-01-08 PROCEDURE — 99214 OFFICE O/P EST MOD 30 MIN: CPT | Performed by: OPTOMETRIST

## 2024-01-08 ASSESSMENT — KERATOMETRY
OS: 43.88
OD: 43.38

## 2024-01-08 ASSESSMENT — VISUAL ACUITY
OS: 20/25
OD: 20/25

## 2024-01-08 ASSESSMENT — INTRAOCULAR PRESSURE
OS: 16
OD: 15

## 2024-08-07 ENCOUNTER — APPOINTMENT (RX ONLY)
Dept: URBAN - METROPOLITAN AREA CLINIC 158 | Facility: CLINIC | Age: 84
Setting detail: DERMATOLOGY
End: 2024-08-07

## 2024-08-07 DIAGNOSIS — L82.1 OTHER SEBORRHEIC KERATOSIS: ICD-10-CM

## 2024-08-07 DIAGNOSIS — L81.5 LEUKODERMA, NOT ELSEWHERE CLASSIFIED: ICD-10-CM

## 2024-08-07 DIAGNOSIS — D22 MELANOCYTIC NEVI: ICD-10-CM

## 2024-08-07 DIAGNOSIS — L81.4 OTHER MELANIN HYPERPIGMENTATION: ICD-10-CM

## 2024-08-07 PROBLEM — D22.72 MELANOCYTIC NEVI OF LEFT LOWER LIMB, INCLUDING HIP: Status: ACTIVE | Noted: 2024-08-07

## 2024-08-07 PROBLEM — L81.9 DISORDER OF PIGMENTATION, UNSPECIFIED: Status: ACTIVE | Noted: 2024-08-07

## 2024-08-07 PROCEDURE — ? TREATMENT REGIMEN

## 2024-08-07 PROCEDURE — ? COUNSELING

## 2024-08-07 PROCEDURE — ? PRESCRIPTION

## 2024-08-07 PROCEDURE — 99213 OFFICE O/P EST LOW 20 MIN: CPT

## 2024-08-07 RX ORDER — TRIAMCINOLONE ACETONIDE 1 MG/G
CREAM TOPICAL
Qty: 30 | Refills: 0 | Status: ERX

## 2024-08-07 ASSESSMENT — LOCATION DETAILED DESCRIPTION DERM
LOCATION DETAILED: LEFT VENTRAL DISTAL FOREARM
LOCATION DETAILED: LEFT PROXIMAL PRETIBIAL REGION
LOCATION DETAILED: RIGHT SUPERIOR MEDIAL MIDBACK
LOCATION DETAILED: RIGHT ANTERIOR SHOULDER
LOCATION DETAILED: LEFT SUPERIOR LATERAL LOWER BACK

## 2024-08-07 ASSESSMENT — LOCATION SIMPLE DESCRIPTION DERM
LOCATION SIMPLE: LEFT LOWER BACK
LOCATION SIMPLE: RIGHT LOWER BACK
LOCATION SIMPLE: LEFT FOREARM
LOCATION SIMPLE: LEFT PRETIBIAL REGION
LOCATION SIMPLE: RIGHT SHOULDER

## 2024-08-07 ASSESSMENT — LOCATION ZONE DERM
LOCATION ZONE: ARM
LOCATION ZONE: TRUNK
LOCATION ZONE: LEG

## 2024-08-07 NOTE — PROCEDURE: TREATMENT REGIMEN
Detail Level: Zone
Plan: Begin wearing gloves when cleaning with chemicals. Begin triamcinolone 0.01% cream twice daily x 1 week. Stop, repeat if itch recurs.

## 2024-09-15 NOTE — PROCEDURE: TREATMENT REGIMEN
Plan: Patient had one occurrence in December 2022, treated with clotrimazole betamethasone. Will in the future treat with Neosporin.
Detail Level: Zone
Assistance OOB with selected safe patient handling equipment if applicable/Assistance with ambulation/Communicate risk of Fall with Harm to all staff, patient, and family/Monitor gait and stability/Provide visual cue: red socks, yellow wristband, yellow gown, etc/Reinforce activity limits and safety measures with patient and family/Bed in lowest position, wheels locked, appropriate side rails in place/Call bell, personal items and telephone in reach/Instruct patient to call for assistance before getting out of bed/chair/stretcher/Non-slip footwear applied when patient is off stretcher/[place] to call system/Physically safe environment - no spills, clutter or unnecessary equipment/Purposeful Proactive Rounding/Room/bathroom lighting operational, light cord in reach

## 2024-11-07 ENCOUNTER — APPOINTMENT (RX ONLY)
Dept: URBAN - METROPOLITAN AREA CLINIC 158 | Facility: CLINIC | Age: 84
Setting detail: DERMATOLOGY
End: 2024-11-07

## 2024-11-07 DIAGNOSIS — L44.8 OTHER SPECIFIED PAPULOSQUAMOUS DISORDERS: ICD-10-CM

## 2024-11-07 DIAGNOSIS — L81.5 LEUKODERMA, NOT ELSEWHERE CLASSIFIED: ICD-10-CM

## 2024-11-07 PROBLEM — L81.9 DISORDER OF PIGMENTATION, UNSPECIFIED: Status: ACTIVE | Noted: 2024-11-07

## 2024-11-07 PROBLEM — D48.5 NEOPLASM OF UNCERTAIN BEHAVIOR OF SKIN: Status: ACTIVE | Noted: 2024-11-07

## 2024-11-07 PROCEDURE — ? PRESCRIPTION

## 2024-11-07 PROCEDURE — 99212 OFFICE O/P EST SF 10 MIN: CPT | Mod: 25

## 2024-11-07 PROCEDURE — 11102 TANGNTL BX SKIN SINGLE LES: CPT

## 2024-11-07 PROCEDURE — ? BIOPSY BY SHAVE METHOD

## 2024-11-07 PROCEDURE — ? TREATMENT REGIMEN

## 2024-11-07 RX ORDER — TRIAMCINOLONE ACETONIDE 1 MG/G
CREAM TOPICAL
Qty: 30 | Refills: 0 | Status: CANCELLED
Stop reason: CLARIF

## 2024-11-07 ASSESSMENT — LOCATION ZONE DERM: LOCATION ZONE: TRUNK

## 2024-11-07 ASSESSMENT — LOCATION SIMPLE DESCRIPTION DERM: LOCATION SIMPLE: CHEST

## 2024-11-07 ASSESSMENT — LOCATION DETAILED DESCRIPTION DERM: LOCATION DETAILED: UPPER STERNUM

## 2024-11-07 NOTE — PROCEDURE: TREATMENT REGIMEN
Detail Level: Zone
Plan: LOV:\\nBegin wearing gloves when cleaning with chemicals. Begin triamcinolone 0.01% cream twice daily x 1 week. Stop, repeat if itch recurs.\\n\\nUpdate 11/7/24:\\nItch has cleared except for over the left thumb base. May continue to use TAC as needed itching

## 2025-01-09 ENCOUNTER — OFFICE VISIT (OUTPATIENT)
Dept: URBAN - METROPOLITAN AREA CLINIC 51 | Facility: CLINIC | Age: 85
End: 2025-01-09
Payer: COMMERCIAL

## 2025-01-09 DIAGNOSIS — D31.32 BENIGN NEOPLASM OF LEFT CHOROID: ICD-10-CM

## 2025-01-09 DIAGNOSIS — D31.31 BENIGN NEOPLASM OF RIGHT CHOROID: ICD-10-CM

## 2025-01-09 DIAGNOSIS — H43.313 VITREOUS MEMBRANES AND STRANDS, BILATERAL: ICD-10-CM

## 2025-01-09 DIAGNOSIS — H04.123 TEAR FILM INSUFFICIENCY OF BILATERAL LACRIMAL GLANDS: ICD-10-CM

## 2025-01-09 DIAGNOSIS — R73.09 OTHER ABNORMAL GLUCOSE: Primary | ICD-10-CM

## 2025-01-09 DIAGNOSIS — Z96.1 PRESENCE OF INTRAOCULAR LENS: ICD-10-CM

## 2025-01-09 PROCEDURE — 92134 CPTRZ OPH DX IMG PST SGM RTA: CPT | Performed by: OPTOMETRIST

## 2025-01-09 PROCEDURE — 92014 COMPRE OPH EXAM EST PT 1/>: CPT | Performed by: OPTOMETRIST

## 2025-01-09 ASSESSMENT — KERATOMETRY
OS: 43.63
OD: 43.63

## 2025-01-09 ASSESSMENT — INTRAOCULAR PRESSURE
OD: 15
OS: 15

## 2025-01-09 ASSESSMENT — VISUAL ACUITY
OS: 20/20
OD: 20/20

## 2025-05-28 ENCOUNTER — APPOINTMENT (OUTPATIENT)
Dept: URBAN - METROPOLITAN AREA CLINIC 158 | Facility: CLINIC | Age: 85
Setting detail: DERMATOLOGY
End: 2025-05-28

## 2025-05-28 DIAGNOSIS — L82.1 OTHER SEBORRHEIC KERATOSIS: ICD-10-CM

## 2025-05-28 DIAGNOSIS — D22 MELANOCYTIC NEVI: ICD-10-CM

## 2025-05-28 DIAGNOSIS — L81.4 OTHER MELANIN HYPERPIGMENTATION: ICD-10-CM

## 2025-05-28 DIAGNOSIS — L81.5 LEUKODERMA, NOT ELSEWHERE CLASSIFIED: ICD-10-CM

## 2025-05-28 DIAGNOSIS — Z87.2 PERSONAL HISTORY OF DISEASES OF THE SKIN AND SUBCUTANEOUS TISSUE: ICD-10-CM

## 2025-05-28 PROBLEM — D22.71 MELANOCYTIC NEVI OF RIGHT LOWER LIMB, INCLUDING HIP: Status: ACTIVE | Noted: 2025-05-28

## 2025-05-28 PROBLEM — L81.9 DISORDER OF PIGMENTATION, UNSPECIFIED: Status: ACTIVE | Noted: 2025-05-28

## 2025-05-28 PROCEDURE — 99213 OFFICE O/P EST LOW 20 MIN: CPT

## 2025-05-28 PROCEDURE — ? TREATMENT REGIMEN

## 2025-05-28 PROCEDURE — ? COUNSELING

## 2025-05-28 ASSESSMENT — LOCATION ZONE DERM
LOCATION ZONE: LEG
LOCATION ZONE: ARM
LOCATION ZONE: HAND
LOCATION ZONE: EAR

## 2025-05-28 ASSESSMENT — LOCATION DETAILED DESCRIPTION DERM
LOCATION DETAILED: RIGHT RADIAL DORSAL HAND
LOCATION DETAILED: LEFT PROXIMAL DORSAL FOREARM
LOCATION DETAILED: LEFT DISTAL RADIAL DORSAL FOREARM
LOCATION DETAILED: LEFT INFERIOR HELIX
LOCATION DETAILED: RIGHT PROXIMAL CALF
LOCATION DETAILED: RIGHT PROXIMAL DORSAL FOREARM
LOCATION DETAILED: LEFT RADIAL DORSAL HAND

## 2025-05-28 ASSESSMENT — LOCATION SIMPLE DESCRIPTION DERM
LOCATION SIMPLE: LEFT EAR
LOCATION SIMPLE: LEFT HAND
LOCATION SIMPLE: RIGHT CALF
LOCATION SIMPLE: RIGHT HAND
LOCATION SIMPLE: RIGHT FOREARM
LOCATION SIMPLE: LEFT FOREARM

## 2025-05-28 NOTE — PROCEDURE: TREATMENT REGIMEN
Detail Level: Zone
Plan: LOV:\\nBegin wearing gloves when cleaning with chemicals. Begin triamcinolone 0.01% cream twice daily x 1 week. Stop, repeat if itch recurs.\\n\\nUpdate 11/7/24:\\nItch has cleared except for over the left thumb base. May continue to use TAC as needed itching.\\n\\n5/28/2025:\\nContinue as needed TAC; only occasionally has itching
Plan: Saw PCP Primary for concern of a left ear lesion two months ago. The lesion was frozen with liquid nitrogen. She reports it took a long time for it to heal.\\nPresent site shows only mild hyperpigmentation, consistent with PIH.\\nPatient told to watch for any recurrence, as we recommend for all lesions treated with LN2.\\nIf the lesion should recur, she will return for examination
Detail Level: Detailed